# Patient Record
Sex: MALE
[De-identification: names, ages, dates, MRNs, and addresses within clinical notes are randomized per-mention and may not be internally consistent; named-entity substitution may affect disease eponyms.]

---

## 2017-04-19 ENCOUNTER — APPOINTMENT (OUTPATIENT)
Dept: PEDIATRICS | Facility: CLINIC | Age: 22
End: 2017-04-19

## 2017-04-19 VITALS — WEIGHT: 173 LBS | HEIGHT: 73.5 IN | TEMPERATURE: 98.6 F | BODY MASS INDEX: 22.44 KG/M2

## 2017-04-19 DIAGNOSIS — J45.909 UNSPECIFIED ASTHMA, UNCOMPLICATED: ICD-10-CM

## 2017-08-16 ENCOUNTER — APPOINTMENT (OUTPATIENT)
Dept: PEDIATRICS | Facility: CLINIC | Age: 22
End: 2017-08-16
Payer: COMMERCIAL

## 2017-08-16 VITALS
HEIGHT: 71 IN | TEMPERATURE: 98.2 F | BODY MASS INDEX: 25.48 KG/M2 | RESPIRATION RATE: 18 BRPM | HEART RATE: 68 BPM | DIASTOLIC BLOOD PRESSURE: 68 MMHG | SYSTOLIC BLOOD PRESSURE: 120 MMHG | WEIGHT: 182 LBS

## 2017-08-16 DIAGNOSIS — Z00.129 ENCOUNTER FOR ROUTINE CHILD HEALTH EXAMINATION W/OUT ABNORMAL FINDINGS: ICD-10-CM

## 2017-08-16 DIAGNOSIS — Z87.898 PERSONAL HISTORY OF OTHER SPECIFIED CONDITIONS: ICD-10-CM

## 2017-08-16 DIAGNOSIS — Z87.09 PERSONAL HISTORY OF OTHER DISEASES OF THE RESPIRATORY SYSTEM: ICD-10-CM

## 2017-08-16 DIAGNOSIS — J98.01 ACUTE BRONCHOSPASM: ICD-10-CM

## 2017-08-16 PROCEDURE — 92552 PURE TONE AUDIOMETRY AIR: CPT

## 2017-08-16 PROCEDURE — 99395 PREV VISIT EST AGE 18-39: CPT

## 2017-08-16 RX ORDER — AMOXICILLIN AND CLAVULANATE POTASSIUM 875; 125 MG/1; MG/1
875-125 TABLET, COATED ORAL
Qty: 20 | Refills: 0 | Status: COMPLETED | COMMUNITY
Start: 2017-04-19 | End: 2017-08-16

## 2017-08-16 RX ORDER — DOXYCYCLINE 100 MG/1
100 CAPSULE ORAL
Qty: 60 | Refills: 0 | Status: COMPLETED | COMMUNITY
Start: 2016-11-08 | End: 2017-08-16

## 2017-09-19 ENCOUNTER — MESSAGE (OUTPATIENT)
Age: 22
End: 2017-09-19

## 2018-12-17 ENCOUNTER — APPOINTMENT (OUTPATIENT)
Dept: PEDIATRICS | Facility: CLINIC | Age: 23
End: 2018-12-17
Payer: COMMERCIAL

## 2018-12-17 VITALS
DIASTOLIC BLOOD PRESSURE: 74 MMHG | SYSTOLIC BLOOD PRESSURE: 118 MMHG | RESPIRATION RATE: 18 BRPM | WEIGHT: 177.56 LBS | BODY MASS INDEX: 24.86 KG/M2 | TEMPERATURE: 97.4 F | HEART RATE: 72 BPM | HEIGHT: 71 IN

## 2018-12-17 DIAGNOSIS — Z00.00 ENCOUNTER FOR GENERAL ADULT MEDICAL EXAMINATION W/OUT ABNORMAL FINDINGS: ICD-10-CM

## 2018-12-17 DIAGNOSIS — Z86.69 PERSONAL HISTORY OF OTHER DISEASES OF THE NERVOUS SYSTEM AND SENSE ORGANS: ICD-10-CM

## 2018-12-17 PROCEDURE — 96110 DEVELOPMENTAL SCREEN W/SCORE: CPT

## 2018-12-17 PROCEDURE — 99395 PREV VISIT EST AGE 18-39: CPT

## 2018-12-17 PROCEDURE — 92551 PURE TONE HEARING TEST AIR: CPT

## 2018-12-17 NOTE — HISTORY OF PRESENT ILLNESS
[Mother] : mother [Goes to dentist yearly] : Patient goes to dentist yearly [Eats meals with family] : eats meals with family [Has family members/adults to turn to for help] : has family members/adults to turn to for help [Is permitted and is able to make independent decisions] : Is permitted and is able to make independent decisions [Eats regular meals including adequate fruits and vegetables] : eats regular meals including adequate fruits and vegetables [Calcium source] : calcium source [Has concerns about body or appearance] : has concerns about body or appearance [Has friends] : has friends [At least 1 hour of physical activity a day] : at least 1 hour of physical activity a day [Uses drugs] : uses drugs  [Drinks alcohol] : drinks alcohol [Exposure to alcohol] : exposure to alcohol [Uses safety belts/safety equipment] : uses safety belts/safety equipment  [Has peer relationships free of violence] : has peer relationships free of violence [Has ways to cope with stress] : has ways to cope with stress [Displays self-confidence] : displays self-confidence [Sleep Concerns] : no sleep concerns [Screen time (except homework) less than 2 hours a day] : no screen time (except homework) less than 2 hours a day [Uses electronic nicotine delivery system] : does not use electronic nicotine delivery system [Uses tobacco] : does not use tobacco [Exposure to tobacco] : no exposure to tobacco [Exposure to drugs] : no exposure to drugs [Impaired/distracted driving] : no impaired/distracted driving [Has had sexual intercourse] : has not had sexual intercourse [Has problems with sleep] : does not have problems with sleep [Gets depressed, anxious, or irritable/has mood swings] : does not get depressed, anxious, or irritable/has mood swings [Has thought about hurting self or considered suicide] : has not thought about hurting self or considered suicide [de-identified] : works as an  [FreeTextEntry1] : This is a 23 year male here for routine exam . Parents denies any Emergency visits or specialized visits unless listed below\par

## 2018-12-17 NOTE — PHYSICAL EXAM

## 2018-12-17 NOTE — DISCUSSION/SUMMARY
[FreeTextEntry1] : THis is a 23 year old male  patient here for routine exam  He is physically active for at least 1 hr a day . He goes to the gym on a regular basis\par .. I explained that it is important to limit screen time to less than 2 hrs a day. Patients diet was discussed and advice given on how to maintain good healthy caloric intake .\par Physical exam is within normal limits . Routine labs were ordered results to be discussed once available \par Patient to follow up in 1 year for routine exam . Due to patients age he has aged out of our practice age wise and will be referred to an internal medicine practice for future care\par \par